# Patient Record
Sex: FEMALE | Race: OTHER | HISPANIC OR LATINO | Employment: OTHER | URBAN - METROPOLITAN AREA
[De-identification: names, ages, dates, MRNs, and addresses within clinical notes are randomized per-mention and may not be internally consistent; named-entity substitution may affect disease eponyms.]

---

## 2021-11-01 ENCOUNTER — INFUSION (OUTPATIENT)
Dept: INFECTIOUS DISEASES | Facility: HOSPITAL | Age: 76
End: 2021-11-01
Attending: EMERGENCY MEDICINE
Payer: MEDICARE

## 2021-11-01 ENCOUNTER — HOSPITAL ENCOUNTER (EMERGENCY)
Facility: HOSPITAL | Age: 76
Discharge: HOME | End: 2021-11-01
Attending: EMERGENCY MEDICINE
Payer: MEDICARE

## 2021-11-01 VITALS
DIASTOLIC BLOOD PRESSURE: 95 MMHG | HEART RATE: 66 BPM | BODY MASS INDEX: 28.47 KG/M2 | OXYGEN SATURATION: 97 % | RESPIRATION RATE: 18 BRPM | HEIGHT: 60 IN | TEMPERATURE: 100 F | WEIGHT: 145 LBS | SYSTOLIC BLOOD PRESSURE: 202 MMHG

## 2021-11-01 VITALS
DIASTOLIC BLOOD PRESSURE: 94 MMHG | SYSTOLIC BLOOD PRESSURE: 204 MMHG | BODY MASS INDEX: 28.47 KG/M2 | HEART RATE: 70 BPM | RESPIRATION RATE: 16 BRPM | WEIGHT: 145 LBS | HEIGHT: 60 IN | OXYGEN SATURATION: 97 % | TEMPERATURE: 98 F

## 2021-11-01 DIAGNOSIS — R03.0 ELEVATED BLOOD PRESSURE READING: Primary | ICD-10-CM

## 2021-11-01 DIAGNOSIS — U07.1 COVID-19: ICD-10-CM

## 2021-11-01 DIAGNOSIS — U07.1 COVID-19: Primary | ICD-10-CM

## 2021-11-01 PROCEDURE — 99284 PR EMERGENCY DEPT VISIT,LEVEL IV: ICD-10-PCS | Mod: CR,,, | Performed by: PHYSICIAN ASSISTANT

## 2021-11-01 PROCEDURE — M0243 CASIRIVI AND IMDEVI INFUSION: HCPCS | Performed by: EMERGENCY MEDICINE

## 2021-11-01 PROCEDURE — 25000003 PHARM REV CODE 250: Performed by: EMERGENCY MEDICINE

## 2021-11-01 PROCEDURE — 99284 EMERGENCY DEPT VISIT MOD MDM: CPT | Mod: 25

## 2021-11-01 PROCEDURE — 99284 EMERGENCY DEPT VISIT MOD MDM: CPT | Mod: CR,,, | Performed by: PHYSICIAN ASSISTANT

## 2021-11-01 PROCEDURE — 63600175 PHARM REV CODE 636 W HCPCS: Performed by: EMERGENCY MEDICINE

## 2021-11-01 RX ORDER — SODIUM CHLORIDE 0.9 % (FLUSH) 0.9 %
10 SYRINGE (ML) INJECTION
Status: ACTIVE | OUTPATIENT
Start: 2021-11-01

## 2021-11-01 RX ORDER — ALBUTEROL SULFATE 90 UG/1
2 AEROSOL, METERED RESPIRATORY (INHALATION)
Status: DISCONTINUED | OUTPATIENT
Start: 2021-11-01 | End: 2021-11-01

## 2021-11-01 RX ORDER — ALBUTEROL SULFATE 90 UG/1
1-2 AEROSOL, METERED RESPIRATORY (INHALATION) EVERY 6 HOURS PRN
Qty: 8 G | Refills: 0 | Status: SHIPPED | OUTPATIENT
Start: 2021-11-01

## 2021-11-01 RX ORDER — DIPHENHYDRAMINE HYDROCHLORIDE 50 MG/ML
25 INJECTION INTRAMUSCULAR; INTRAVENOUS ONCE AS NEEDED
Status: ACTIVE | OUTPATIENT
Start: 2021-11-01 | End: 2033-03-30

## 2021-11-01 RX ORDER — EPINEPHRINE 0.3 MG/.3ML
0.3 INJECTION SUBCUTANEOUS
Status: ACTIVE | OUTPATIENT
Start: 2021-11-01

## 2021-11-01 RX ORDER — ACETAMINOPHEN 325 MG/1
650 TABLET ORAL ONCE AS NEEDED
Status: COMPLETED | OUTPATIENT
Start: 2021-11-01 | End: 2021-11-01

## 2021-11-01 RX ORDER — ONDANSETRON 4 MG/1
4 TABLET, ORALLY DISINTEGRATING ORAL ONCE AS NEEDED
Status: ACTIVE | OUTPATIENT
Start: 2021-11-01 | End: 2033-03-30

## 2021-11-01 RX ORDER — BENZONATATE 100 MG/1
100 CAPSULE ORAL EVERY 8 HOURS PRN
Qty: 12 CAPSULE | Refills: 0 | Status: SHIPPED | OUTPATIENT
Start: 2021-11-01

## 2021-11-01 RX ADMIN — CASIRIVIMAB AND IMDEVIMAB 600 MG: 600; 600 INJECTION, SOLUTION, CONCENTRATE INTRAVENOUS at 01:11

## 2021-11-01 RX ADMIN — ACETAMINOPHEN 650 MG: 325 TABLET ORAL at 01:11

## 2021-11-09 ENCOUNTER — PES CALL (OUTPATIENT)
Dept: ADMINISTRATIVE | Facility: CLINIC | Age: 76
End: 2021-11-09
Payer: MEDICARE

## 2022-10-01 ENCOUNTER — HOSPITAL ENCOUNTER (EMERGENCY)
Facility: HOSPITAL | Age: 77
Discharge: HOME | End: 2022-10-02
Attending: STUDENT IN AN ORGANIZED HEALTH CARE EDUCATION/TRAINING PROGRAM
Payer: MEDICARE
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
## 2022-10-01 DIAGNOSIS — T14.8XXA HEMATOMA AND CONTUSION: ICD-10-CM

## 2022-10-01 DIAGNOSIS — M54.9 BACK PAIN, UNSPECIFIED BACK LOCATION, UNSPECIFIED BACK PAIN LATERALITY, UNSPECIFIED CHRONICITY: Primary | ICD-10-CM

## 2022-10-01 LAB
ALBUMIN SERPL BCP-MCNC: 4.2 G/DL (ref 3.5–5.2)
ALP SERPL-CCNC: 50 U/L (ref 55–135)
ALT SERPL W/O P-5'-P-CCNC: 19 U/L (ref 10–44)
ANION GAP SERPL CALC-SCNC: 13 MMOL/L (ref 8–16)
APTT BLDCRRT: 24.2 SEC (ref 21–32)
AST SERPL-CCNC: 18 U/L (ref 10–40)
BASOPHILS # BLD AUTO: 0.04 K/UL (ref 0–0.2)
BASOPHILS NFR BLD: 0.3 % (ref 0–1.9)
BILIRUB SERPL-MCNC: 0.4 MG/DL (ref 0.1–1)
BUN SERPL-MCNC: 19 MG/DL (ref 8–23)
CALCIUM SERPL-MCNC: 9.7 MG/DL (ref 8.7–10.5)
CHLORIDE SERPL-SCNC: 103 MMOL/L (ref 95–110)
CO2 SERPL-SCNC: 19 MMOL/L (ref 23–29)
CREAT SERPL-MCNC: 0.9 MG/DL (ref 0.5–1.4)
DIFFERENTIAL METHOD: ABNORMAL
EOSINOPHIL # BLD AUTO: 0.3 K/UL (ref 0–0.5)
EOSINOPHIL NFR BLD: 2.4 % (ref 0–8)
ERYTHROCYTE [DISTWIDTH] IN BLOOD BY AUTOMATED COUNT: 14 % (ref 11.5–14.5)
EST. GFR  (NO RACE VARIABLE): >60 ML/MIN/1.73 M^2
GLUCOSE SERPL-MCNC: 92 MG/DL (ref 70–110)
HCT VFR BLD AUTO: 33.8 % (ref 37–48.5)
HCV AB SERPL QL IA: NORMAL
HGB BLD-MCNC: 10.7 G/DL (ref 12–16)
HIV 1+2 AB+HIV1 P24 AG SERPL QL IA: NORMAL
IMM GRANULOCYTES # BLD AUTO: 0.04 K/UL (ref 0–0.04)
IMM GRANULOCYTES NFR BLD AUTO: 0.3 % (ref 0–0.5)
INR PPP: 1 (ref 0.8–1.2)
LYMPHOCYTES # BLD AUTO: 2.7 K/UL (ref 1–4.8)
LYMPHOCYTES NFR BLD: 21.5 % (ref 18–48)
MCH RBC QN AUTO: 28.8 PG (ref 27–31)
MCHC RBC AUTO-ENTMCNC: 31.7 G/DL (ref 32–36)
MCV RBC AUTO: 91 FL (ref 82–98)
MONOCYTES # BLD AUTO: 1.1 K/UL (ref 0.3–1)
MONOCYTES NFR BLD: 8.3 % (ref 4–15)
NEUTROPHILS # BLD AUTO: 8.6 K/UL (ref 1.8–7.7)
NEUTROPHILS NFR BLD: 67.2 % (ref 38–73)
NRBC BLD-RTO: 0 /100 WBC
PLATELET # BLD AUTO: 242 K/UL (ref 150–450)
PMV BLD AUTO: 11 FL (ref 9.2–12.9)
POTASSIUM SERPL-SCNC: 3.9 MMOL/L (ref 3.5–5.1)
PROT SERPL-MCNC: 7.4 G/DL (ref 6–8.4)
PROTHROMBIN TIME: 10.4 SEC (ref 9–12.5)
RBC # BLD AUTO: 3.71 M/UL (ref 4–5.4)
SODIUM SERPL-SCNC: 135 MMOL/L (ref 136–145)
TROPONIN I SERPL DL<=0.01 NG/ML-MCNC: <0.006 NG/ML (ref 0–0.03)
WBC # BLD AUTO: 12.75 K/UL (ref 3.9–12.7)

## 2022-10-01 PROCEDURE — 86850 RBC ANTIBODY SCREEN: CPT | Performed by: STUDENT IN AN ORGANIZED HEALTH CARE EDUCATION/TRAINING PROGRAM

## 2022-10-01 PROCEDURE — 85610 PROTHROMBIN TIME: CPT | Performed by: STUDENT IN AN ORGANIZED HEALTH CARE EDUCATION/TRAINING PROGRAM

## 2022-10-01 PROCEDURE — 99284 EMERGENCY DEPT VISIT MOD MDM: CPT | Mod: ,,, | Performed by: STUDENT IN AN ORGANIZED HEALTH CARE EDUCATION/TRAINING PROGRAM

## 2022-10-01 PROCEDURE — 99285 EMERGENCY DEPT VISIT HI MDM: CPT | Mod: 25

## 2022-10-01 PROCEDURE — 93005 ELECTROCARDIOGRAM TRACING: CPT

## 2022-10-01 PROCEDURE — 96374 THER/PROPH/DIAG INJ IV PUSH: CPT

## 2022-10-01 PROCEDURE — 93010 ELECTROCARDIOGRAM REPORT: CPT | Mod: ,,, | Performed by: INTERNAL MEDICINE

## 2022-10-01 PROCEDURE — 99284 PR EMERGENCY DEPT VISIT,LEVEL IV: ICD-10-PCS | Mod: ,,, | Performed by: STUDENT IN AN ORGANIZED HEALTH CARE EDUCATION/TRAINING PROGRAM

## 2022-10-01 PROCEDURE — 63600175 PHARM REV CODE 636 W HCPCS: Performed by: STUDENT IN AN ORGANIZED HEALTH CARE EDUCATION/TRAINING PROGRAM

## 2022-10-01 PROCEDURE — 25500020 PHARM REV CODE 255: Performed by: STUDENT IN AN ORGANIZED HEALTH CARE EDUCATION/TRAINING PROGRAM

## 2022-10-01 PROCEDURE — 85025 COMPLETE CBC W/AUTO DIFF WBC: CPT | Performed by: STUDENT IN AN ORGANIZED HEALTH CARE EDUCATION/TRAINING PROGRAM

## 2022-10-01 PROCEDURE — 84484 ASSAY OF TROPONIN QUANT: CPT | Performed by: STUDENT IN AN ORGANIZED HEALTH CARE EDUCATION/TRAINING PROGRAM

## 2022-10-01 PROCEDURE — 86803 HEPATITIS C AB TEST: CPT | Performed by: PHYSICIAN ASSISTANT

## 2022-10-01 PROCEDURE — 96375 TX/PRO/DX INJ NEW DRUG ADDON: CPT | Mod: 59

## 2022-10-01 PROCEDURE — 85730 THROMBOPLASTIN TIME PARTIAL: CPT | Performed by: STUDENT IN AN ORGANIZED HEALTH CARE EDUCATION/TRAINING PROGRAM

## 2022-10-01 PROCEDURE — 96376 TX/PRO/DX INJ SAME DRUG ADON: CPT | Mod: 59

## 2022-10-01 PROCEDURE — 80053 COMPREHEN METABOLIC PANEL: CPT | Performed by: STUDENT IN AN ORGANIZED HEALTH CARE EDUCATION/TRAINING PROGRAM

## 2022-10-01 PROCEDURE — 93010 EKG 12-LEAD: ICD-10-PCS | Mod: ,,, | Performed by: INTERNAL MEDICINE

## 2022-10-01 PROCEDURE — 87389 HIV-1 AG W/HIV-1&-2 AB AG IA: CPT | Performed by: PHYSICIAN ASSISTANT

## 2022-10-01 RX ORDER — FENTANYL CITRATE 50 UG/ML
50 INJECTION, SOLUTION INTRAMUSCULAR; INTRAVENOUS
Status: COMPLETED | OUTPATIENT
Start: 2022-10-01 | End: 2022-10-01

## 2022-10-01 RX ADMIN — IOHEXOL 100 ML: 350 INJECTION, SOLUTION INTRAVENOUS at 10:10

## 2022-10-01 RX ADMIN — FENTANYL CITRATE 50 MCG: 50 INJECTION INTRAMUSCULAR; INTRAVENOUS at 09:10

## 2022-10-02 VITALS
TEMPERATURE: 98 F | WEIGHT: 140 LBS | SYSTOLIC BLOOD PRESSURE: 145 MMHG | HEIGHT: 59 IN | HEART RATE: 72 BPM | OXYGEN SATURATION: 96 % | BODY MASS INDEX: 28.22 KG/M2 | RESPIRATION RATE: 14 BRPM | DIASTOLIC BLOOD PRESSURE: 72 MMHG

## 2022-10-02 LAB
ABO + RH BLD: NORMAL
BLD GP AB SCN CELLS X3 SERPL QL: NORMAL

## 2022-10-02 PROCEDURE — 63600175 PHARM REV CODE 636 W HCPCS: Performed by: STUDENT IN AN ORGANIZED HEALTH CARE EDUCATION/TRAINING PROGRAM

## 2022-10-02 PROCEDURE — 63600175 PHARM REV CODE 636 W HCPCS: Performed by: EMERGENCY MEDICINE

## 2022-10-02 RX ORDER — ONDANSETRON 2 MG/ML
4 INJECTION INTRAMUSCULAR; INTRAVENOUS
Status: COMPLETED | OUTPATIENT
Start: 2022-10-02 | End: 2022-10-02

## 2022-10-02 RX ORDER — HYDROCODONE BITARTRATE AND ACETAMINOPHEN 5; 325 MG/1; MG/1
1 TABLET ORAL EVERY 6 HOURS PRN
Qty: 12 TABLET | Refills: 0 | Status: SHIPPED | OUTPATIENT
Start: 2022-10-02 | End: 2022-10-05

## 2022-10-02 RX ORDER — FENTANYL CITRATE 50 UG/ML
50 INJECTION, SOLUTION INTRAMUSCULAR; INTRAVENOUS
Status: COMPLETED | OUTPATIENT
Start: 2022-10-02 | End: 2022-10-02

## 2022-10-02 RX ADMIN — FENTANYL CITRATE 50 MCG: 50 INJECTION, SOLUTION INTRAMUSCULAR; INTRAVENOUS at 12:10

## 2022-10-02 RX ADMIN — ONDANSETRON 4 MG: 2 INJECTION INTRAMUSCULAR; INTRAVENOUS at 01:10

## 2022-10-02 NOTE — ED TRIAGE NOTES
"Korin Graf, an 77 y.o. female presents to the ED post fall. Pt states " I was standing on a chair and opening the window and lost my balance and fell and hit something on  my back. I am not sure if I hit my head or not but I didn't lose consciousness" pt has area of bruise and hematoma swelling on the mid lower back and rates pain 7/10. Pt denies pain on any other joints.      Chief Complaint   Patient presents with    Fall     Pt c/o fall after standing on a chair and hit her back. Pt denies any numbness or tingling in extremities. Pt has hematoma noted to lower back, but is ambulatory and denies any serious pain at this time.     Review of patient's allergies indicates:  No Known Allergies  No past medical history on file.   "

## 2022-10-02 NOTE — PROVIDER PROGRESS NOTES - EMERGENCY DEPT.
Encounter Date: 10/1/2022    ED Physician Progress Notes        ED Physician Hand-off Note:    ED Course: I assumed care of patient from off-going ED physician team. Briefly, Patient is a 76 yo F with low back pain after a fall found to have a large lower back hematoma  Cspine CT concerning for possible fracture so MRI ordered.    At the time of signout plan was pending MRI c spine.    Medications given in the ED:    Medications   fentaNYL 50 mcg/mL injection 50 mcg (50 mcg Intravenous Given 10/1/22 2130)   iohexoL (OMNIPAQUE 350) injection 100 mL (100 mLs Intravenous Given 10/1/22 2213)   fentaNYL 50 mcg/mL injection 50 mcg (50 mcg Intravenous Given 10/2/22 0045)   ondansetron injection 4 mg (4 mg Intravenous Given 10/2/22 0156)     Imaging Results              MRI Cervical Spine Without Contrast (Final result)  Result time 10/02/22 02:50:36      Final result by Braulio Cantu MD (10/02/22 02:50:36)                   Impression:      1. No MRI evidence to suggest acute fracture.  No focal cord signal abnormality.  Multilevel degenerative changes of the cervical spine, most pronounced at the levels of C3-C4 through C5-C6 as discussed above    Electronically signed by resident: Mojgan Washington  Date:    10/02/2022  Time:    02:04    Electronically signed by: Braulio Cantu MD  Date:    10/02/2022  Time:    02:50               Narrative:    EXAMINATION:  MRI CERVICAL SPINE WITHOUT CONTRAST    CLINICAL HISTORY:  Spine fracture, cervical, traumatic;    TECHNIQUE:  Multiplanar, multisequence MR images of the cervical spine were performed without the administration of contrast.    COMPARISON:  CT cervical spine 10/01/2022    FINDINGS:  Skull base and craniocervical junction within normal limits.  Partially visualized in intracranial structures are unremarkable.  There is mucosal thickening of the right maxillary sinus.    Alignment: Straightening of the cervical lordosis.    Vertebrae: Vertebral body heights  maintained.  No abnormal soft tissue or marrow edema appreciated about C1 to suggest acute fracture.  There is no evidence of diffuse bone marrow replacement process.    Discs: Multilevel disc height loss most severe at C4-C5 and C5-C6.    Cord: No cord signal abnormalities.    Degenerative change:    C2-3: No disc herniation, spinal canal narrowing, or neuroforaminal narrowing.    C3-4:  There is a posterior disc osteophyte complex with slight effacement of the anterior thecal sac.  There is mild bilateral facet arthropathy.  There is mild to moderate left-sided neural foraminal narrowing.    C4-5:  There is a posterior disc osteophyte complex and bilateral uncovertebral spurring.  There is effacement of the anterior thecal sac with mild spinal canal stenosis.  There is moderate to severe left-sided and mild right-sided neural foraminal narrowing.    C5-6:  There is a broad-based posterior disc osteophyte complex, asymmetric to the left.  There is bilateral uncovertebral spurring.  There is effacement of the anterior thecal sac with mild-to-moderate spinal canal stenosis.  There is moderate bilateral neural foraminal narrowing.    C6-7:  There is a mild broad-based posterior disc osteophyte complex.  There is no significant spinal canal stenosis.  There is mild left-sided neural foraminal narrowing.    C7-T1:  No disc herniation, spinal canal narrowing, or neuroforaminal narrowing.    Paraspinal muscles & soft tissues: No significant prevertebral soft tissue edema appreciated.  There is a 0.3 cm T2 hyperintense left thyroid lobe nodule.  The bilateral vertebral artery T2 flow voids are present.                                       CT Chest Abdomen Pelvis With Contrast (xpd) (Final result)  Result time 10/01/22 22:43:40      Final result by Nate Chatterjee MD (10/01/22 22:43:40)                   Impression:      No acute intra thoracic or intra-abdominal abnormality.    Soft tissue hematoma over the lumbar  spine at the L2-3 level measuring 6 cm.      Electronically signed by: Nate Chatterjee  Date:    10/01/2022  Time:    22:43               Narrative:    EXAMINATION:  CT CHEST ABDOMEN PELVIS WITH CONTRAST (XPD)    CLINICAL HISTORY:  Polytrauma, blunt;    TECHNIQUE:  Arterial phase axial CT images were obtained through the chest, abdomen and pelvis following IV administration of 100 mL of Omnipaque 350 contrast. Coronal, sagittal and 3D reconstructions were submitted and interpreted. Dose reduction techniques including automatic exposure control (AEC) were utilized.    COMPARISON:  None    FINDINGS:  Base of Neck: No significant abnormality.    Thoracic soft tissues: Unremarkable.    Heart: Normal size. No effusion.  Atherosclerotic disease in the arch.  No aneurysm or dissection.  Predominant lad coronary artery disease and mitral valve calcifications..    Pulmonary vasculature: Pulmonary arteries distribute normally.  There are four pulmonary veins.    Sakshi/Mediastinum: No pathologic nydia enlargement.    Esophagus: Unremarkable.    Airways: Patent.    Lungs/Pleura: Clear lungs. No pleural effusion or thickening.  Minimal scarring type densities within the left lingula tip.    Liver: Normal in size and attenuation, with no focal hepatic lesions.    Gallbladder: No calcified gallstones.    Bile Ducts: No evidence of dilated ducts.    Pancreas: No mass or peripancreatic fat stranding.    Spleen: Unremarkable.    Adrenals: Unremarkable.    Kidneys/ Ureters: No radiodense stones or hydronephrosis.  The ureters are normal in course and caliber.    Bladder: No evidence of wall thickening.    Reproductive organs: Unremarkable.    GI Tract/Mesentery: No evidence of bowel obstruction or inflammation.    Peritoneal Space: No ascites. No free air.    Retroperitoneum: No significant adenopathy.    Abdominal wall: Soft tissue hematoma posterior to the lumbar region.  Centered at the L2-3 level.  It measures approximately 6 cm in  cranial caudal dimension.    Bones: No acute fracture or aggressive-appearing lytic or blastic lesion.                                       CT Head Without Contrast (Final result)  Result time 10/01/22 22:35:11      Final result by Braulio Cantu MD (10/01/22 22:35:11)                   Impression:      1.  No CT evidence of acute intracranial abnormality.  Mild chronic senescent and microvascular ischemic change.  Clinical correlation and further evaluation as warranted.    2.  Moderate patchy opacification of the paranasal sinuses as above.    3.  Focal defect involving the left lateral posterior arch of C1 which appears well corticated with asymmetric thickening and sclerosis of the right lateral arch of C1.  This configuration is suggestive of sequelae of remote traumatic injury or congenital anomaly.  Otherwise, no CT evidence of acute displaced fracture or traumatic subluxation of the cervical spine.  If the patient's pain persists, follow-up MRI could be performed as clinically warranted.    4.  Mild multilevel degenerative changes of the cervical spine as above.      Electronically signed by: Braulio Cantu MD  Date:    10/01/2022  Time:    22:35               Narrative:    EXAMINATION:  CT HEAD WITHOUT CONTRAST; CT CERVICAL SPINE WITHOUT CONTRAST    CLINICAL HISTORY:  Head trauma, minor (Age >= 65y);; Neck trauma (Age >= 65y);    TECHNIQUE:  Low dose axial images were obtained through the head and cervical spine.  Coronal and sagittal reformations were also performed. Contrast was not administered.    COMPARISON:  None.    FINDINGS:  CT HEAD:    There is no acute intracranial hemorrhage, hydrocephalus, midline shift or mass effect.  There is mild generalized cerebral volume loss.  There is mild patchy periventricular white matter hypoattenuation suggesting sequelae of chronic microvascular ischemic change.  The basal cisterns are patent. There is moderate patchy opacification of the right frontal  sinus, anterior ethmoid air cells, and bilateral sphenoid sinuses.  The mastoid air cells appear well aerated.  The visualized bones of the calvarium demonstrate no acute osseous abnormality.    CT CERVICAL SPINE:    There is straightening of the normal cervical lordosis.  Cervical vertebral body heights appear adequately maintained.  There is a focal defect involving the left lateral posterior arch of C1.  This demonstrates somewhat tapered, relatively well corticated margins.  There is mild thickening and asymmetric sclerosis of the right lateral arch of C1.  This configuration is suggestive of sequelae of remote traumatic injury or congenital anomaly.  The remaining visualized osseous structures appear intact without definite acute displaced fracture.    There is intervertebral disc space height loss and endplate degenerative change at the levels of C4-C5 through C6-C7.  At the level of C4-C5, there is a posterior disc osteophyte complex and bilateral uncovertebral spurring with mild bilateral neural foraminal narrowing.  At the level of C5-C6, there is a posterior disc osteophyte complex and bilateral uncovertebral spurring with mild spinal canal stenosis and mild to moderate bilateral neural foraminal narrowing.    The prevertebral soft tissues do not appear significantly thickened.  There is atherosclerotic calcification of the bilateral carotid bifurcations.  There is a 0.6 cm hypoattenuating left thyroid lobe nodule.  The trachea is midline and patent.  Visualized lung apices are unremarkable.                                       CT Cervical Spine Without Contrast (Final result)  Result time 10/01/22 22:35:11      Final result by Braulio Cantu MD (10/01/22 22:35:11)                   Impression:      1.  No CT evidence of acute intracranial abnormality.  Mild chronic senescent and microvascular ischemic change.  Clinical correlation and further evaluation as warranted.    2.  Moderate patchy  opacification of the paranasal sinuses as above.    3.  Focal defect involving the left lateral posterior arch of C1 which appears well corticated with asymmetric thickening and sclerosis of the right lateral arch of C1.  This configuration is suggestive of sequelae of remote traumatic injury or congenital anomaly.  Otherwise, no CT evidence of acute displaced fracture or traumatic subluxation of the cervical spine.  If the patient's pain persists, follow-up MRI could be performed as clinically warranted.    4.  Mild multilevel degenerative changes of the cervical spine as above.      Electronically signed by: Braulio Cantu MD  Date:    10/01/2022  Time:    22:35               Narrative:    EXAMINATION:  CT HEAD WITHOUT CONTRAST; CT CERVICAL SPINE WITHOUT CONTRAST    CLINICAL HISTORY:  Head trauma, minor (Age >= 65y);; Neck trauma (Age >= 65y);    TECHNIQUE:  Low dose axial images were obtained through the head and cervical spine.  Coronal and sagittal reformations were also performed. Contrast was not administered.    COMPARISON:  None.    FINDINGS:  CT HEAD:    There is no acute intracranial hemorrhage, hydrocephalus, midline shift or mass effect.  There is mild generalized cerebral volume loss.  There is mild patchy periventricular white matter hypoattenuation suggesting sequelae of chronic microvascular ischemic change.  The basal cisterns are patent. There is moderate patchy opacification of the right frontal sinus, anterior ethmoid air cells, and bilateral sphenoid sinuses.  The mastoid air cells appear well aerated.  The visualized bones of the calvarium demonstrate no acute osseous abnormality.    CT CERVICAL SPINE:    There is straightening of the normal cervical lordosis.  Cervical vertebral body heights appear adequately maintained.  There is a focal defect involving the left lateral posterior arch of C1.  This demonstrates somewhat tapered, relatively well corticated margins.  There is mild thickening  and asymmetric sclerosis of the right lateral arch of C1.  This configuration is suggestive of sequelae of remote traumatic injury or congenital anomaly.  The remaining visualized osseous structures appear intact without definite acute displaced fracture.    There is intervertebral disc space height loss and endplate degenerative change at the levels of C4-C5 through C6-C7.  At the level of C4-C5, there is a posterior disc osteophyte complex and bilateral uncovertebral spurring with mild bilateral neural foraminal narrowing.  At the level of C5-C6, there is a posterior disc osteophyte complex and bilateral uncovertebral spurring with mild spinal canal stenosis and mild to moderate bilateral neural foraminal narrowing.    The prevertebral soft tissues do not appear significantly thickened.  There is atherosclerotic calcification of the bilateral carotid bifurcations.  There is a 0.6 cm hypoattenuating left thyroid lobe nodule.  The trachea is midline and patent.  Visualized lung apices are unremarkable.                                        Disposition: home    Patient comfortable with plan for discharge home. Patient counseled regarding exam, results, diagnosis, treatment, and plan.    Impression: Final diagnoses:  [W19.XXXA] Fall  [M54.9] Back pain, unspecified back location, unspecified back pain laterality, unspecified chronicity (Primary)  [T14.8XXA] Hematoma and contusion

## 2022-10-02 NOTE — ED PROVIDER NOTES
Encounter Date: 10/1/2022       History     Chief Complaint   Patient presents with    Fall     Pt c/o fall after standing on a chair and hit her back. Pt denies any numbness or tingling in extremities. Pt has hematoma noted to lower back, but is ambulatory and denies any serious pain at this time.     HPI  Patient is a 77-year-old female with history of hypertension who presents for back pain after a fall.  Patient states that she lost her balance while standing on top of a chair moving boxes for a garage sale.  She states that it was a normal height chair and estimates that she fell approximately 2-3 feet.  She fell backwards onto her back and landed on an object that she is unsure what it was but it was something she was putting up for the garage sale.  She thinks that she might have briefly lost consciousness.  She denies headache, vision changes, vomiting, chest pain, shortness of breath, abdominal pain.  She is reporting midback pain and has associated swelling and bruising to the area.  She states that the bruising and swelling has gotten worse over the last few hours which is what prompted her to come to the emergency department.  No lower extremity weakness, numbness/tingling, saddle paresthesias, loss of bowel or bladder.  She denies preceding chest pain, shortness of breath, palpitations, lightheadedness, dizziness and states that she was reaching for something when she lost her balance causing her to fall.     Review of patient's allergies indicates:  No Known Allergies  No past medical history on file.  No past surgical history on file.  No family history on file.     Review of Systems  Constitutional: No fever, no chills  HENT: No sore throat  Eyes: No eye pain  Respiratory: No shortness of breath  Cardiovascular: No chest pain  Gastrointestinal: No abdominal pain, no nausea, no vomiting, no diarrhea  Genitourinary: No dysuria  Musculoskeletal: + back pain  Neurological: No headache, +  ?LOC  Psychiatric: No agitation     Physical Exam     Initial Vitals [10/01/22 2001]   BP Pulse Resp Temp SpO2   132/79 87 18 98.8 °F (37.1 °C) 95 %      MAP       --         Physical Exam  Constitutional: No acute distress  HENT: Normocephalic, atraumatic  Eyes: PERRL  Spine:  No cervical spine tenderness, positive low thoracic and upper lumbar spinal tenderness with overlying large hematoma and ecchymosis in the midline   Respiratory: Breath sounds equal bilaterally  Cardiovascular: Regular rate and rhythm, intact distal pulses in all extremities  Gastrointestinal: Soft, non-tender, non-distended  Pelvis: Stable  Musculoskeletal: No deformity  Integumentary: Warm and dry  Neurological: Awake and alert, 5/5 motor in all extremities, sensation light touch intact in all extremities, no saddle anesthesia, cranial nerves 2-12 grossly intact     ED Course   Procedures  Labs Reviewed   CBC W/ AUTO DIFFERENTIAL - Abnormal; Notable for the following components:       Result Value    WBC 12.75 (*)     RBC 3.71 (*)     Hemoglobin 10.7 (*)     Hematocrit 33.8 (*)     MCHC 31.7 (*)     Gran # (ANC) 8.6 (*)     Mono # 1.1 (*)     All other components within normal limits   COMPREHENSIVE METABOLIC PANEL - Abnormal; Notable for the following components:    Sodium 135 (*)     CO2 19 (*)     Alkaline Phosphatase 50 (*)     All other components within normal limits   HIV 1 / 2 ANTIBODY    Narrative:     Release to patient->Immediate   HEPATITIS C ANTIBODY    Narrative:     Release to patient->Immediate   PROTIME-INR   APTT   TROPONIN I   TYPE & SCREEN     EKG Readings: (Independently Interpreted)   EKG was sinus rhythm, rate 75, PAC present, no STEMI.     Imaging Results              CT Chest Abdomen Pelvis With Contrast (xpd) (Final result)  Result time 10/01/22 22:43:40      Final result by Nate Chatterjee MD (10/01/22 22:43:40)                   Impression:      No acute intra thoracic or intra-abdominal abnormality.    Soft  tissue hematoma over the lumbar spine at the L2-3 level measuring 6 cm.      Electronically signed by: Nate Chatterjee  Date:    10/01/2022  Time:    22:43               Narrative:    EXAMINATION:  CT CHEST ABDOMEN PELVIS WITH CONTRAST (XPD)    CLINICAL HISTORY:  Polytrauma, blunt;    TECHNIQUE:  Arterial phase axial CT images were obtained through the chest, abdomen and pelvis following IV administration of 100 mL of Omnipaque 350 contrast. Coronal, sagittal and 3D reconstructions were submitted and interpreted. Dose reduction techniques including automatic exposure control (AEC) were utilized.    COMPARISON:  None    FINDINGS:  Base of Neck: No significant abnormality.    Thoracic soft tissues: Unremarkable.    Heart: Normal size. No effusion.  Atherosclerotic disease in the arch.  No aneurysm or dissection.  Predominant lad coronary artery disease and mitral valve calcifications..    Pulmonary vasculature: Pulmonary arteries distribute normally.  There are four pulmonary veins.    Sakshi/Mediastinum: No pathologic nydia enlargement.    Esophagus: Unremarkable.    Airways: Patent.    Lungs/Pleura: Clear lungs. No pleural effusion or thickening.  Minimal scarring type densities within the left lingula tip.    Liver: Normal in size and attenuation, with no focal hepatic lesions.    Gallbladder: No calcified gallstones.    Bile Ducts: No evidence of dilated ducts.    Pancreas: No mass or peripancreatic fat stranding.    Spleen: Unremarkable.    Adrenals: Unremarkable.    Kidneys/ Ureters: No radiodense stones or hydronephrosis.  The ureters are normal in course and caliber.    Bladder: No evidence of wall thickening.    Reproductive organs: Unremarkable.    GI Tract/Mesentery: No evidence of bowel obstruction or inflammation.    Peritoneal Space: No ascites. No free air.    Retroperitoneum: No significant adenopathy.    Abdominal wall: Soft tissue hematoma posterior to the lumbar region.  Centered at the L2-3 level.   It measures approximately 6 cm in cranial caudal dimension.    Bones: No acute fracture or aggressive-appearing lytic or blastic lesion.                                       CT Head Without Contrast (Final result)  Result time 10/01/22 22:35:11      Final result by Braulio Cantu MD (10/01/22 22:35:11)                   Impression:      1.  No CT evidence of acute intracranial abnormality.  Mild chronic senescent and microvascular ischemic change.  Clinical correlation and further evaluation as warranted.    2.  Moderate patchy opacification of the paranasal sinuses as above.    3.  Focal defect involving the left lateral posterior arch of C1 which appears well corticated with asymmetric thickening and sclerosis of the right lateral arch of C1.  This configuration is suggestive of sequelae of remote traumatic injury or congenital anomaly.  Otherwise, no CT evidence of acute displaced fracture or traumatic subluxation of the cervical spine.  If the patient's pain persists, follow-up MRI could be performed as clinically warranted.    4.  Mild multilevel degenerative changes of the cervical spine as above.      Electronically signed by: Braulio Cantu MD  Date:    10/01/2022  Time:    22:35               Narrative:    EXAMINATION:  CT HEAD WITHOUT CONTRAST; CT CERVICAL SPINE WITHOUT CONTRAST    CLINICAL HISTORY:  Head trauma, minor (Age >= 65y);; Neck trauma (Age >= 65y);    TECHNIQUE:  Low dose axial images were obtained through the head and cervical spine.  Coronal and sagittal reformations were also performed. Contrast was not administered.    COMPARISON:  None.    FINDINGS:  CT HEAD:    There is no acute intracranial hemorrhage, hydrocephalus, midline shift or mass effect.  There is mild generalized cerebral volume loss.  There is mild patchy periventricular white matter hypoattenuation suggesting sequelae of chronic microvascular ischemic change.  The basal cisterns are patent. There is moderate patchy  opacification of the right frontal sinus, anterior ethmoid air cells, and bilateral sphenoid sinuses.  The mastoid air cells appear well aerated.  The visualized bones of the calvarium demonstrate no acute osseous abnormality.    CT CERVICAL SPINE:    There is straightening of the normal cervical lordosis.  Cervical vertebral body heights appear adequately maintained.  There is a focal defect involving the left lateral posterior arch of C1.  This demonstrates somewhat tapered, relatively well corticated margins.  There is mild thickening and asymmetric sclerosis of the right lateral arch of C1.  This configuration is suggestive of sequelae of remote traumatic injury or congenital anomaly.  The remaining visualized osseous structures appear intact without definite acute displaced fracture.    There is intervertebral disc space height loss and endplate degenerative change at the levels of C4-C5 through C6-C7.  At the level of C4-C5, there is a posterior disc osteophyte complex and bilateral uncovertebral spurring with mild bilateral neural foraminal narrowing.  At the level of C5-C6, there is a posterior disc osteophyte complex and bilateral uncovertebral spurring with mild spinal canal stenosis and mild to moderate bilateral neural foraminal narrowing.    The prevertebral soft tissues do not appear significantly thickened.  There is atherosclerotic calcification of the bilateral carotid bifurcations.  There is a 0.6 cm hypoattenuating left thyroid lobe nodule.  The trachea is midline and patent.  Visualized lung apices are unremarkable.                                       CT Cervical Spine Without Contrast (Final result)  Result time 10/01/22 22:35:11      Final result by Braulio Cantu MD (10/01/22 22:35:11)                   Impression:      1.  No CT evidence of acute intracranial abnormality.  Mild chronic senescent and microvascular ischemic change.  Clinical correlation and further evaluation as  warranted.    2.  Moderate patchy opacification of the paranasal sinuses as above.    3.  Focal defect involving the left lateral posterior arch of C1 which appears well corticated with asymmetric thickening and sclerosis of the right lateral arch of C1.  This configuration is suggestive of sequelae of remote traumatic injury or congenital anomaly.  Otherwise, no CT evidence of acute displaced fracture or traumatic subluxation of the cervical spine.  If the patient's pain persists, follow-up MRI could be performed as clinically warranted.    4.  Mild multilevel degenerative changes of the cervical spine as above.      Electronically signed by: Braulio Cantu MD  Date:    10/01/2022  Time:    22:35               Narrative:    EXAMINATION:  CT HEAD WITHOUT CONTRAST; CT CERVICAL SPINE WITHOUT CONTRAST    CLINICAL HISTORY:  Head trauma, minor (Age >= 65y);; Neck trauma (Age >= 65y);    TECHNIQUE:  Low dose axial images were obtained through the head and cervical spine.  Coronal and sagittal reformations were also performed. Contrast was not administered.    COMPARISON:  None.    FINDINGS:  CT HEAD:    There is no acute intracranial hemorrhage, hydrocephalus, midline shift or mass effect.  There is mild generalized cerebral volume loss.  There is mild patchy periventricular white matter hypoattenuation suggesting sequelae of chronic microvascular ischemic change.  The basal cisterns are patent. There is moderate patchy opacification of the right frontal sinus, anterior ethmoid air cells, and bilateral sphenoid sinuses.  The mastoid air cells appear well aerated.  The visualized bones of the calvarium demonstrate no acute osseous abnormality.    CT CERVICAL SPINE:    There is straightening of the normal cervical lordosis.  Cervical vertebral body heights appear adequately maintained.  There is a focal defect involving the left lateral posterior arch of C1.  This demonstrates somewhat tapered, relatively well corticated  margins.  There is mild thickening and asymmetric sclerosis of the right lateral arch of C1.  This configuration is suggestive of sequelae of remote traumatic injury or congenital anomaly.  The remaining visualized osseous structures appear intact without definite acute displaced fracture.    There is intervertebral disc space height loss and endplate degenerative change at the levels of C4-C5 through C6-C7.  At the level of C4-C5, there is a posterior disc osteophyte complex and bilateral uncovertebral spurring with mild bilateral neural foraminal narrowing.  At the level of C5-C6, there is a posterior disc osteophyte complex and bilateral uncovertebral spurring with mild spinal canal stenosis and mild to moderate bilateral neural foraminal narrowing.    The prevertebral soft tissues do not appear significantly thickened.  There is atherosclerotic calcification of the bilateral carotid bifurcations.  There is a 0.6 cm hypoattenuating left thyroid lobe nodule.  The trachea is midline and patent.  Visualized lung apices are unremarkable.                                       Medications   fentaNYL 50 mcg/mL injection 50 mcg (50 mcg Intravenous Given 10/1/22 2130)   iohexoL (OMNIPAQUE 350) injection 100 mL (100 mLs Intravenous Given 10/1/22 2213)   fentaNYL 50 mcg/mL injection 50 mcg (50 mcg Intravenous Given 10/2/22 0045)     Medical Decision Making:   Clinical Tests:   Lab Tests: Ordered and Reviewed  Radiological Study: Ordered and Reviewed  Medical Tests: Ordered and Reviewed  ED Management:  Patient presents for a mechanical fall off of a chair onto her back.  Possible LOC. Neurologically intact on exam.  Hemodynamically stable.  Her exam is notable for midline tenderness in the lower thoracic and upper lumbar spinal region with overlying hematoma and ecchymosis in the midline.  Labs, pain scan and pain control ordered.  Dispo pending imaging.    On re-evaluation, hematoma appears stable but she does have more  surrounding bruising.  Labs and imaging reviewed.      CT cervical spine with focal defect involving the left lateral posterior arch of C1 which appears well corticated with asymmetric thickening and sclerosis of the right lateral arch of C1.  This configuration is suggestive of sequelae of remote traumatic injury or congenital anomaly.  Otherwise, no CT evidence of acute displaced fracture or traumatic subluxation of the cervical spine.     Patient is neurologically intact and is not complaining of neck pain.  She has no midline cervical spine tenderness on exam.  CT findings concerning for remote traumatic injury versus congenital anomaly.  Discussed with neurosurgery who recommended an MRI.  MRI cervical spine ordered.  Patient placed in cervical collar for precautions.  Dispo pending MRI.  Care transitioned to oncoming staff.  Other:   I have discussed this case with another health care provider.                        Clinical Impression:   Final diagnoses:  [W19.XXXA] Fall  [M54.9] Back pain, unspecified back location, unspecified back pain laterality, unspecified chronicity (Primary)  [T14.8XXA] Hematoma and contusion               Giulia Wagner MD  10/02/22 0105       Giulia Wagner MD  10/02/22 0128

## 2022-10-02 NOTE — DISCHARGE INSTRUCTIONS
You should continue to ice the hematoma on your back.  You can continue to take Tylenol and ibuprofen at home as needed for pain.    Please follow-up with your primary care doctor.  If your symptoms worsen or you have any concerns, you should return to the emergency department for evaluation.